# Patient Record
Sex: FEMALE | Race: WHITE | NOT HISPANIC OR LATINO | URBAN - METROPOLITAN AREA
[De-identification: names, ages, dates, MRNs, and addresses within clinical notes are randomized per-mention and may not be internally consistent; named-entity substitution may affect disease eponyms.]

---

## 2020-10-14 ENCOUNTER — TELEPHONE (OUTPATIENT)
Dept: OBSTETRICS AND GYNECOLOGY | Facility: CLINIC | Age: 35
End: 2020-10-14

## 2020-10-14 NOTE — TELEPHONE ENCOUNTER
----- Message from Anthony Bashir sent at 10/14/2020  1:19 PM CDT -----  Regarding: pt  Pt would like a call from nurse in regards to scheduling a appt .Please call back at .[phone] (home)         Thank you,   Anthony Bashir

## 2020-10-14 NOTE — TELEPHONE ENCOUNTER
Returned call to pt, who states that she needs an appt due to irregular cycles. Pt states that she has a hx of anemia and currently receives iron infusions at [facility]. Pt says she's concerned about the inconsistency with her cycle. Confirmed appt. Pt aware of mask/check in/visitor policy. Pt verbalized understanding.

## 2020-10-28 ENCOUNTER — TELEPHONE (OUTPATIENT)
Dept: OBSTETRICS AND GYNECOLOGY | Facility: CLINIC | Age: 35
End: 2020-10-28

## 2020-10-28 NOTE — TELEPHONE ENCOUNTER
----- Message from Jasmyn Garcia sent at 10/28/2020  2:22 PM CDT -----  Regarding: self  Type: Patient Call Back    Who called Self    What is the request in detail: please contact to reschedule    Can the clinic reply by [facility]? no    Would the patient rather a call back or a response via [facility]?  Call   Best call back number: [phone]